# Patient Record
Sex: FEMALE | URBAN - METROPOLITAN AREA
[De-identification: names, ages, dates, MRNs, and addresses within clinical notes are randomized per-mention and may not be internally consistent; named-entity substitution may affect disease eponyms.]

---

## 2020-05-13 ENCOUNTER — IMPORTED ENCOUNTER (OUTPATIENT)
Dept: URBAN - METROPOLITAN AREA CLINIC 1 | Facility: CLINIC | Age: 25
End: 2020-05-13

## 2022-04-02 ASSESSMENT — VISUAL ACUITY
OD_SC: 20/30
OU_SC: 20/25
OS_SC: 20/30

## 2024-10-18 PROCEDURE — RXMED WILLOW AMBULATORY MEDICATION CHARGE

## 2024-10-28 ENCOUNTER — PHARMACY VISIT (OUTPATIENT)
Dept: PHARMACY | Facility: CLINIC | Age: 29
End: 2024-10-28
Payer: COMMERCIAL

## 2024-12-09 PROCEDURE — RXMED WILLOW AMBULATORY MEDICATION CHARGE

## 2024-12-10 ENCOUNTER — PHARMACY VISIT (OUTPATIENT)
Dept: PHARMACY | Facility: CLINIC | Age: 29
End: 2024-12-10
Payer: COMMERCIAL

## 2025-03-14 ENCOUNTER — APPOINTMENT (OUTPATIENT)
Dept: PRIMARY CARE | Facility: CLINIC | Age: 30
End: 2025-03-14
Payer: COMMERCIAL

## 2025-03-14 VITALS
HEART RATE: 76 BPM | DIASTOLIC BLOOD PRESSURE: 82 MMHG | BODY MASS INDEX: 39.99 KG/M2 | TEMPERATURE: 98.8 F | OXYGEN SATURATION: 98 % | WEIGHT: 233 LBS | SYSTOLIC BLOOD PRESSURE: 117 MMHG

## 2025-03-14 DIAGNOSIS — Z76.89 ENCOUNTER TO ESTABLISH CARE WITH NEW DOCTOR: Primary | ICD-10-CM

## 2025-03-14 DIAGNOSIS — M72.2 PLANTAR FASCIITIS OF RIGHT FOOT: ICD-10-CM

## 2025-03-14 LAB
ALBUMIN SERPL BCP-MCNC: 3.9 G/DL (ref 3.4–5)
ALP SERPL-CCNC: 80 U/L (ref 45–117)
ALT SERPL W P-5'-P-CCNC: 30 U/L (ref 16–63)
ANION GAP SERPL CALC-SCNC: 13 MMOL/L (ref 10–20)
APPEARANCE UR: CLEAR
AST SERPL W P-5'-P-CCNC: 16 U/L (ref 15–37)
BASOPHILS # BLD AUTO: 0.03 X10*3/UL (ref 0.1–1.6)
BASOPHILS NFR BLD AUTO: 0.4 % (ref 0–0.3)
BILIRUB SERPL-MCNC: 0.3 MG/DL (ref 0.2–1)
BILIRUB UR QL STRIP: NEGATIVE
BUN SERPL-MCNC: 10 MG/DL (ref 7–18)
CALCIUM SERPL-MCNC: 9.5 MG/DL (ref 8.5–10.1)
CHLORIDE SERPL-SCNC: 105 MMOL/L (ref 98–107)
CHOLEST SERPL-MCNC: 200 MG/DL (ref 0–199)
CHOLESTEROL/HDL RATIO: 3.3 (ref 4.2–7)
CO2 SERPL-SCNC: 27 MMOL/L (ref 21–32)
COLOR UR: YELLOW
CREAT SERPL-MCNC: 0.67 MG/DL (ref 0.6–1.1)
EGFRCR SERPLBLD CKD-EPI 2021: >90 ML/MIN/1.73M*2
EOSINOPHIL # BLD AUTO: 0.57 X10*3/UL (ref 0.04–0.5)
EOSINOPHIL NFR BLD AUTO: 6.61 % (ref 0.7–7)
ERYTHROCYTE [DISTWIDTH] IN BLOOD BY AUTOMATED COUNT: 13.4 % (ref 11.5–14.5)
GLUCOSE SERPL-MCNC: 86 MG/DL (ref 74–100)
GLUCOSE UR STRIP-MCNC: NEGATIVE MG/DL
HBA1C MFR BLD: 5.7 %
HCT VFR BLD AUTO: 44.4 % (ref 36.6–46.6)
HDLC SERPL-MCNC: 60 MG/DL (ref 40–59)
HGB BLD-MCNC: 15.1 G/DL (ref 12–15.4)
HGB UR QL STRIP: ABNORMAL
IS PATIENT FASTING: ABNORMAL
KETONES UR STRIP-MCNC: NEGATIVE MG/DL
LDLC SERPL DIRECT ASSAY-MCNC: 116 MG/DL (ref 0–100)
LEUKOCYTE ESTERASE UR QL STRIP: NEGATIVE
LYMPHOCYTES # BLD AUTO: 2.27 X10*3/UL (ref 0–6)
LYMPHOCYTES NFR BLD AUTO: 26.24 % (ref 20.5–51.1)
MCH RBC QN AUTO: 30.7 PG (ref 26–32)
MCHC RBC AUTO-ENTMCNC: 34 G/DL (ref 31–38)
MCV RBC AUTO: 90.3 FL (ref 80–96)
MONOCYTES # BLD AUTO: 0.5 X10*3/UL (ref 1.6–24.9)
MONOCYTES NFR BLD AUTO: 5.83 % (ref 1.7–9.3)
NEUTROPHILS # BLD AUTO: 5.27 X10*3/UL (ref 1.4–6.5)
NEUTROPHILS NFR BLD AUTO: 60.92 % (ref 42.2–75.2)
NITRITE UR QL STRIP: NEGATIVE
PH UR STRIP: 6 [PH]
PLATELET # BLD AUTO: 366.7 X10*3/UL (ref 150–450)
PMV BLD AUTO: 8.97 FL (ref 7.8–11)
POTASSIUM SERPL-SCNC: 4.5 MMOL/L (ref 3.5–5.1)
PROT SERPL-MCNC: 8 G/DL (ref 6.4–8.2)
PROT UR STRIP-MCNC: NEGATIVE MG/DL
RBC # BLD AUTO: 4.92 X10*6/UL (ref 3.9–5.3)
SODIUM SERPL-SCNC: 140 MMOL/L (ref 136–145)
SP GR UR STRIP.AUTO: 1.02
TRIGL SERPL-MCNC: 109 MG/DL
TSH SERPL-ACNC: 1.61 MIU/L (ref 0.44–3.98)
UROBILINOGEN UR STRIP-ACNC: 0.2 E.U./DL
WBC # BLD AUTO: 8.66 X10*3/UL (ref 4.5–10.5)

## 2025-03-14 PROCEDURE — RXMED WILLOW AMBULATORY MEDICATION CHARGE

## 2025-03-14 RX ORDER — PREDNISONE 10 MG/1
10 TABLET ORAL DAILY
Qty: 10 TABLET | Refills: 1 | Status: SHIPPED | OUTPATIENT
Start: 2025-03-14 | End: 2025-09-10

## 2025-03-14 RX ORDER — MELOXICAM 15 MG/1
15 TABLET ORAL DAILY
Qty: 10 TABLET | Refills: 1 | Status: SHIPPED | OUTPATIENT
Start: 2025-03-14 | End: 2026-03-14

## 2025-03-14 ASSESSMENT — ENCOUNTER SYMPTOMS
OCCASIONAL FEELINGS OF UNSTEADINESS: 0
LOSS OF SENSATION IN FEET: 0
DEPRESSION: 0

## 2025-03-14 ASSESSMENT — PATIENT HEALTH QUESTIONNAIRE - PHQ9
2. FEELING DOWN, DEPRESSED OR HOPELESS: NOT AT ALL
1. LITTLE INTEREST OR PLEASURE IN DOING THINGS: NOT AT ALL
SUM OF ALL RESPONSES TO PHQ9 QUESTIONS 1 AND 2: 0

## 2025-03-14 NOTE — PROGRESS NOTES
Subjective   Patient ID: Lyssa Elizondo is a 29 y.o. female who presents for Foot Pain (1 year getting worse, need referral).    HPI   New patient.  29 years old female comes in to see me for the first time in this office with no past medical or surgical history.  She only takes birth control pills and on Lamictal by her psychiatrist since 2021, she takes 150 mg a day.  No known allergies.  Non-smoker and no alcohol intake.   with no children 1 year.  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  She does not know much about her mother but her father lives in [place] with her stepfather.  She suffered from plantar fasciitis for the past 2 years and now it is acting up again because of working on the weekend second job on her feet all day.  Review of Systems  12 system reviewed 12 system are negative except for plantar fasciitis on the right foot.    Objective   /82 (BP Location: Right arm, Patient Position: Sitting, BP Cuff Size: Large adult)   Pulse 76   Temp 37.1 °C (98.8 °F) (Temporal)   Wt 106 kg (233 lb)   SpO2 98%   BMI 39.99 kg/m²     Physical Exam  Alert oriented in no distress very pleasant and cooperative.  Nonicteric sclera no jaundice.  Face symmetrical cranial nerves intact.  Neck supple no masses lymph no thyromegaly or jugular venous distention no carotid bruits.  Lungs clear no rales wheezing or crackles.  Heart normal S1 and S2 regular rhythm.  Abdomen benign nontender no masses no organomegaly no pain on palpations.  Neurological exam intact equal strength in the upper and lower extremities.  No sensory deficit.  Skin intact.  Her weight was discussed 233 pounds.  She is aware of it advised weight loss.  Treating plantar fasciitis with Mobic 15 mg daily for 5 days plus prednisone 10 mg a day for 5 days.  To use also pad donut shaped pad on her right heel.  Massage heel with icy cold bottle or can.  And of course avoid standing on it long hours.  Assessment/Plan    New patient  Plantar fasciitis on the right foot

## 2025-03-17 ENCOUNTER — PHARMACY VISIT (OUTPATIENT)
Dept: PHARMACY | Facility: CLINIC | Age: 30
End: 2025-03-17
Payer: COMMERCIAL

## 2025-03-17 PROCEDURE — RXMED WILLOW AMBULATORY MEDICATION CHARGE

## 2025-03-18 ENCOUNTER — TELEPHONE (OUTPATIENT)
Dept: PRIMARY CARE | Facility: CLINIC | Age: 30
End: 2025-03-18
Payer: COMMERCIAL

## 2025-03-18 NOTE — TELEPHONE ENCOUNTER
I called the patient with lab results from the last visit.  She had normal complete blood count.  Normal CMP and normal thyroid test.  Her A1c is 5.7.  Her LDL is little high 116 it should be below 100.  Urine shows little bit small amount of blood starting her..  Discussed her plantar fasciitis and the improvement hopefully to make.  She is using massages and also massaging the foot on the cold cane or bottle of water frozen.  Also using donut shaped pad on her foot.  She will let me know how it goes.

## 2025-04-02 ENCOUNTER — PHARMACY VISIT (OUTPATIENT)
Dept: PHARMACY | Facility: CLINIC | Age: 30
End: 2025-04-02
Payer: COMMERCIAL

## 2025-04-02 PROCEDURE — RXMED WILLOW AMBULATORY MEDICATION CHARGE

## 2025-04-07 PROCEDURE — RXMED WILLOW AMBULATORY MEDICATION CHARGE

## 2025-04-08 ENCOUNTER — PHARMACY VISIT (OUTPATIENT)
Dept: PHARMACY | Facility: CLINIC | Age: 30
End: 2025-04-08
Payer: COMMERCIAL

## 2025-05-16 ENCOUNTER — OFFICE VISIT (OUTPATIENT)
Facility: CLINIC | Age: 30
End: 2025-05-16
Payer: COMMERCIAL

## 2025-05-16 VITALS
DIASTOLIC BLOOD PRESSURE: 100 MMHG | BODY MASS INDEX: 39.9 KG/M2 | WEIGHT: 233.7 LBS | SYSTOLIC BLOOD PRESSURE: 120 MMHG | HEIGHT: 64 IN

## 2025-05-16 DIAGNOSIS — Z30.41 FAMILY PLANNING, BCP (BIRTH CONTROL PILLS) MAINTENANCE: ICD-10-CM

## 2025-05-16 DIAGNOSIS — F32.81 PMDD (PREMENSTRUAL DYSPHORIC DISORDER): ICD-10-CM

## 2025-05-16 DIAGNOSIS — Z30.41 FAMILY PLANNING, BCP (BIRTH CONTROL PILLS) MAINTENANCE: Primary | ICD-10-CM

## 2025-05-16 PROCEDURE — 99213 OFFICE O/P EST LOW 20 MIN: CPT | Performed by: OBSTETRICS & GYNECOLOGY

## 2025-05-16 PROCEDURE — 3008F BODY MASS INDEX DOCD: CPT | Performed by: OBSTETRICS & GYNECOLOGY

## 2025-05-16 RX ORDER — DESOGESTREL AND ETHINYL ESTRADIOL 0.15-0.03
KIT ORAL
Qty: 84 TABLET | Refills: 3 | OUTPATIENT
Start: 2025-05-16

## 2025-05-16 RX ORDER — DESOGESTREL AND ETHINYL ESTRADIOL 0.15-0.03
1 KIT ORAL DAILY
Qty: 90 TABLET | Refills: 3 | Status: SHIPPED | OUTPATIENT
Start: 2025-05-16 | End: 2025-05-16

## 2025-05-16 ASSESSMENT — ENCOUNTER SYMPTOMS
LOSS OF SENSATION IN FEET: 0
DEPRESSION: 0
OCCASIONAL FEELINGS OF UNSTEADINESS: 0

## 2025-05-16 ASSESSMENT — PATIENT HEALTH QUESTIONNAIRE - PHQ9
1. LITTLE INTEREST OR PLEASURE IN DOING THINGS: NOT AT ALL
SUM OF ALL RESPONSES TO PHQ9 QUESTIONS 1 AND 2: 0
2. FEELING DOWN, DEPRESSED OR HOPELESS: NOT AT ALL

## 2025-05-16 ASSESSMENT — PAIN SCALES - GENERAL: PAINLEVEL_OUTOF10: 0-NO PAIN

## 2025-05-16 NOTE — PROGRESS NOTES
GYN OFFICE VISIT    Patient Name:  Lyssa Elizondo  :  1995  MR #:  [id number]  Acct #:  [id number]      ASSESSMENT/PLAN:     There are no diagnoses linked to this encounter.     Lyssa was seen today for follow-up.  Diagnoses and all orders for this visit:  Family planning, BCP (birth control pills) maintenance (Primary)  -     desogestrel-ethinyl estradiol (Enskyce) 0.15-0.03 mg tablet; Take 1 tablet by mouth once daily. X 21 days then start new pack of pills.  Continuous dosing for PMDD.  Dispense 4 packs  PMDD (premenstrual dysphoric disorder)        Assessment & Plan  Menstrual irregularities and PMS  Persistent menstrual irregularities and severe PMS despite three different birth control formulations. Current formulation is Apree, a 30 microgram estrogen with desogestrel, which is androgen neutral. Periods are regulated but severe PMS symptoms persist. Plan to skip placebo pills to manage symptoms better. Discussed potential for breakthrough bleeding and advised periodic allowance of a natural period every two to three months to mitigate this.  - Instruct to skip placebo pills in current birth control pack.  - Refill 90-day supply of current birth control.  - Advise to allow a period every two to three months to prevent breakthrough bleeding.  - Consult with psychiatrist regarding potential need to adjust Lamictal dose due to interaction with estrogen.    Blood pressure monitoring  Blood pressure reading was 120/92, slightly elevated, possibly due to white coat effect.  - Repeat blood pressure measurement.         .All questions answered.  Contraception: OCP (estrogen/progesterone).  Diagnosis explained in detail, including differential.  Discussed healthy lifestyle modifications.    Follow up in about 1 year (around 2026) for well woman exam.      Subjective    Chief Complaint   Patient presents with    Follow-up       Lyssa Elizondo is a 29 y.o.  Patient's last menstrual period was  04/18/2025 (exact date).     History of Present Illness  Lyssa Elizondo is a 29 year old female who presents with menstrual irregularities and mood disturbances related to birth control use.    She has been experiencing menstrual irregularities and mood disturbances related to her birth control. She has tried three different birth control formulations since her last visit. The first formulation caused severe premenstrual syndrome (PMS), prompting a change. The second formulation resulted in her period starting on the Wednesday of the sugar pills and ending on Sunday. The dose was then increased, and her period shifted to starting on Friday and ending on Tuesday, but her periods remain 'horrible' and her mood is still significantly affected.    Her mood deteriorates significantly before her period, impacting her interactions with her boyfriend and at work. She describes feeling like 'crashing out' and 'hates everyone'.    She is currently on a 30 microgram estrogen pill with desogestrel, which she has been taking for three months. She previously used a birth control where she skipped the sugar pills, which she found beneficial.    She is also on Lamictal, prescribed by her psychiatric physician assistant, and there is a concern about potential interactions with her birth control, which may necessitate a dose adjustment.       Medical History[1]    Surgical History[2]    Social History     Socioeconomic History    Marital status: Single     Spouse name: Not on file    Number of children: 0    Years of education: Not on file    Highest education level: Not on file   Occupational History    Occupation: Ophthalmology tech     Employer: [employer]   Tobacco Use    Smoking status: Never     Passive exposure: Never    Smokeless tobacco: Never   Vaping Use    Vaping status: Never Used   Substance and Sexual Activity    Alcohol use: Not Currently    Drug use: Never    Sexual activity: Yes     Partners: Male  "    Birth control/protection: Condom Male, OCP   Other Topics Concern    Not on file   Social History Narrative    Not on file     Social Drivers of Health     Financial Resource Strain: Not on file   Food Insecurity: Not on file   Transportation Needs: Not on file   Physical Activity: Not on file   Stress: Not on file   Social Connections: Not on file   Intimate Partner Violence: Not on file   Housing Stability: Not on file       Family History[3]    Prior to Admission medications    Medication Sig Start Date End Date Taking? Authorizing Provider   desogestrel-ethinyl estradiol (Enskyce) 0.15-0.03 mg tablet Take 1 tablet by mouth once daily. 3/17/25 3/17/26  Rupinder Pillai,    ketoconazole (NIZOral) 2 % shampoo Apply topically 2 times a week. Leave on 5 minutes before rinsing 1/29/24   Nabila Araujo MD   lamoTRIgine (LaMICtal) 150 mg tablet Take 1 tablet (150 mg) by mouth once daily. 4/24/24      meloxicam (Mobic) 15 mg tablet Take 1 tablet (15 mg) by mouth once daily. 3/14/25 3/14/26  Hussein Guzman MD   predniSONE (Deltasone) 10 mg tablet Take 1 tablet (10 mg) by mouth once daily. 3/14/25 9/10/25  Hussein Guzman MD       Allergies[4]           OBJECTIVE:   BP (!) 120/100   Ht 1.626 m (5' 4\")   Wt 106 kg (233 lb 11.2 oz)   LMP 04/18/2025 (Exact Date)   BMI 40.11 kg/m²   Body mass index is 40.11 kg/m².     Physical Exam  Constitutional:       General: She is not in acute distress.     Appearance: Normal appearance.   Cardiovascular:      Rate and Rhythm: Normal rate and regular rhythm.      Heart sounds: Normal heart sounds.   Pulmonary:      Effort: Pulmonary effort is normal.      Breath sounds: Normal breath sounds. No wheezing or rhonchi.   Abdominal:      General: Bowel sounds are normal. There is no distension.      Tenderness: There is no abdominal tenderness. There is no guarding.   Neurological:      Mental Status: She is alert.         Results  PATHOLOGY  Pap smear: Negative (2023)       Note: " This dictation was generated using Dragon voice recognition software. Please excuse any grammatical or spelling errors that may have occurred using the system.    This medical note was created with the assistance of artificial intelligence (AI) for documentation purposes. The content has been reviewed and confirmed by the healthcare provider for accuracy and completeness. Patient consented to the use of audio recording and use of AI during their visit.             [1]   Past Medical History:  Diagnosis Date    Depression     Personal history of other drug therapy     History of varicella vaccination    Personal history of other mental and behavioral disorders     History of anxiety disorder   [2] History reviewed. No pertinent surgical history.  [3]   Family History  Problem Relation Name Age of Onset    Arthritis Mother Ivee     Depression Mother Ivee     Ovarian cancer Mother Ivee      labor Mother Ivee     Depression Father All     Cervical cancer Mother's Sister      Depression Father's Sister Beba     Skin cancer Maternal Grandmother Kelsey     Diabetes Maternal Grandfather Rivas     Heart disease Maternal Grandfather Rivas     Hypertension Maternal Grandfather Rivas     Arthritis Paternal Grandmother Madelin    [4] No Known Allergies

## 2025-05-17 RX ORDER — DESOGESTREL AND ETHINYL ESTRADIOL 0.15-0.03
1 KIT ORAL DAILY
Qty: 90 TABLET | Refills: 3 | Status: SHIPPED | OUTPATIENT
Start: 2025-05-17 | End: 2026-05-17

## 2025-06-05 PROCEDURE — RXMED WILLOW AMBULATORY MEDICATION CHARGE

## 2025-06-09 ENCOUNTER — PHARMACY VISIT (OUTPATIENT)
Dept: PHARMACY | Facility: CLINIC | Age: 30
End: 2025-06-09
Payer: COMMERCIAL